# Patient Record
Sex: FEMALE | Race: WHITE | NOT HISPANIC OR LATINO
[De-identification: names, ages, dates, MRNs, and addresses within clinical notes are randomized per-mention and may not be internally consistent; named-entity substitution may affect disease eponyms.]

---

## 2018-07-27 ENCOUNTER — TRANSCRIPTION ENCOUNTER (OUTPATIENT)
Age: 43
End: 2018-07-27

## 2018-07-31 ENCOUNTER — APPOINTMENT (OUTPATIENT)
Dept: INTERNAL MEDICINE | Facility: CLINIC | Age: 43
End: 2018-07-31
Payer: MEDICARE

## 2018-07-31 VITALS
BODY MASS INDEX: 30.39 KG/M2 | HEART RATE: 128 BPM | HEIGHT: 64 IN | TEMPERATURE: 97.3 F | OXYGEN SATURATION: 98 % | WEIGHT: 178 LBS

## 2018-07-31 VITALS — SYSTOLIC BLOOD PRESSURE: 170 MMHG | DIASTOLIC BLOOD PRESSURE: 90 MMHG

## 2018-07-31 VITALS — DIASTOLIC BLOOD PRESSURE: 90 MMHG | SYSTOLIC BLOOD PRESSURE: 170 MMHG

## 2018-07-31 DIAGNOSIS — Z80.9 FAMILY HISTORY OF MALIGNANT NEOPLASM, UNSPECIFIED: ICD-10-CM

## 2018-07-31 PROCEDURE — 99204 OFFICE O/P NEW MOD 45 MIN: CPT

## 2018-07-31 PROCEDURE — 99214 OFFICE O/P EST MOD 30 MIN: CPT

## 2018-08-01 ENCOUNTER — OUTPATIENT (OUTPATIENT)
Dept: OUTPATIENT SERVICES | Facility: HOSPITAL | Age: 43
LOS: 1 days | End: 2018-08-01
Payer: MEDICARE

## 2018-08-01 PROCEDURE — G9001: CPT

## 2018-08-02 NOTE — PHYSICAL EXAM

## 2018-08-02 NOTE — ASSESSMENT
[FreeTextEntry1] : she agrees to see a therapist\par may need to see ortho but left knee seems better today and prob no need for a surgical procedreagrees to continue to cut down on pain meds\par will get her to sign a paper next month. reviewed I stop and her meds. since she has been been my patient in the past I trust her at this point and will see her in one mo

## 2018-08-02 NOTE — HEALTH RISK ASSESSMENT
[Any fall with injury in past year] : Patient reported fall with injury in the past year [1] : 2) Feeling down, depressed, or hopeless for several days (1) [] : No [de-identified] : Pt reports falling and banging up her knee. [FreeTextEntry1] : Pt reports her anxiety has been really high. [[id number]] : 2

## 2018-08-02 NOTE — PLAN
[FreeTextEntry1] : patient agrees to see a mental health provider, will take her meds judiciously and stay carefully with her meds\par must take bp meds

## 2018-08-02 NOTE — REVIEW OF SYSTEMS
[Negative] : Heme/Lymph [FreeTextEntry2] : stressed/ notin own house [FreeTextEntry5] : has high blood pressure [FreeTextEntry6] : stopped smoking.  [FreeTextEntry7] : no  [FreeTextEntry8] : no [FreeTextEntry9] : back pain, stable, shoulder hurts and it is tight, knee pain: went to urgent care, swollen, possible sprain. recurrent sprain of her right risk [de-identified] : no [de-identified] : stressed.  [FreeTextEntry1] : reg periods, had a pap sometime in the past, had one mammogtram but needs one

## 2018-08-02 NOTE — HISTORY OF PRESENT ILLNESS
[FreeTextEntry8] : here to update chart and as a new patient/ she has chronic pain in her back and left knee. also very stressed with unstable living arrangements.

## 2018-08-28 ENCOUNTER — CLINICAL ADVICE (OUTPATIENT)
Age: 43
End: 2018-08-28

## 2018-08-28 DIAGNOSIS — Z71.89 OTHER SPECIFIED COUNSELING: ICD-10-CM

## 2018-08-29 ENCOUNTER — APPOINTMENT (OUTPATIENT)
Dept: INTERNAL MEDICINE | Facility: CLINIC | Age: 43
End: 2018-08-29
Payer: MEDICARE

## 2018-08-29 VITALS
TEMPERATURE: 98.2 F | OXYGEN SATURATION: 98 % | WEIGHT: 175 LBS | HEIGHT: 64 IN | BODY MASS INDEX: 29.88 KG/M2 | HEART RATE: 91 BPM

## 2018-08-29 VITALS — SYSTOLIC BLOOD PRESSURE: 160 MMHG | DIASTOLIC BLOOD PRESSURE: 90 MMHG

## 2018-08-29 PROCEDURE — 99214 OFFICE O/P EST MOD 30 MIN: CPT

## 2018-08-29 NOTE — PHYSICAL EXAM

## 2018-08-29 NOTE — HEALTH RISK ASSESSMENT
[No falls in past year] : Patient reported no falls in the past year [0] : 1) Little interest or pleasure doing things: Not at all (0) [1] : 2) Feeling down, depressed, or hopeless for several days (1) [] : No [[id number]] : 1

## 2018-08-29 NOTE — REVIEW OF SYSTEMS
[Orthopnea] : orthopnea [Negative] : Heme/Lymph [FreeTextEntry2] : no weight loss, see above [FreeTextEntry5] : bp is really hi [FreeTextEntry6] : still smokes [FreeTextEntry7] : see above [FreeTextEntry8] : reg periods

## 2018-08-29 NOTE — HISTORY OF PRESENT ILLNESS
[FreeTextEntry8] : such stress in life.diarrhea , watery, x 3 days, is tolerating less pain meds: ran out of pain meds several days ago. wrist and ankle hurts,\par no fever or chills, no vomiting, watery stool persisits, no real pain, son is not sick\par no blood in stood. no h/o of ulcers

## 2018-08-29 NOTE — PLAN
[FreeTextEntry1] : reassurance about diarrhea, so much back ground stress\par discussed stress manageement. also discussed letter for support animal

## 2018-09-25 ENCOUNTER — MEDICATION RENEWAL (OUTPATIENT)
Age: 43
End: 2018-09-25

## 2018-09-25 RX ORDER — OXYCODONE AND ACETAMINOPHEN 10; 325 MG/1; MG/1
10-325 TABLET ORAL
Qty: 60 | Refills: 0 | Status: DISCONTINUED | COMMUNITY
Start: 2018-07-31 | End: 2018-09-25

## 2018-09-25 RX ORDER — OXYCODONE AND ACETAMINOPHEN 10; 325 MG/1; MG/1
10-325 TABLET ORAL
Qty: 50 | Refills: 0 | Status: DISCONTINUED | COMMUNITY
End: 2018-09-25

## 2018-09-25 RX ORDER — ALPRAZOLAM 0.5 MG/1
0.5 TABLET ORAL
Qty: 60 | Refills: 0 | Status: DISCONTINUED | COMMUNITY
End: 2018-09-25

## 2018-09-25 RX ORDER — ALPRAZOLAM 0.5 MG/1
0.5 TABLET ORAL
Qty: 60 | Refills: 0 | Status: DISCONTINUED | COMMUNITY
Start: 2018-07-31 | End: 2018-09-25

## 2018-09-26 ENCOUNTER — RX RENEWAL (OUTPATIENT)
Age: 43
End: 2018-09-26

## 2018-09-26 ENCOUNTER — MEDICATION RENEWAL (OUTPATIENT)
Age: 43
End: 2018-09-26

## 2018-10-01 ENCOUNTER — APPOINTMENT (OUTPATIENT)
Dept: INTERNAL MEDICINE | Facility: CLINIC | Age: 43
End: 2018-10-01
Payer: MEDICARE

## 2018-10-01 ENCOUNTER — LABORATORY RESULT (OUTPATIENT)
Age: 43
End: 2018-10-01

## 2018-10-01 VITALS — DIASTOLIC BLOOD PRESSURE: 80 MMHG | SYSTOLIC BLOOD PRESSURE: 130 MMHG

## 2018-10-01 VITALS — HEIGHT: 64 IN | HEART RATE: 122 BPM | OXYGEN SATURATION: 98 % | TEMPERATURE: 98.2 F

## 2018-10-01 PROCEDURE — 99214 OFFICE O/P EST MOD 30 MIN: CPT | Mod: 25

## 2018-10-01 PROCEDURE — 36415 COLL VENOUS BLD VENIPUNCTURE: CPT

## 2018-10-01 NOTE — HISTORY OF PRESENT ILLNESS
[FreeTextEntry8] : says had a fever and was sweating yesterday. trying to cut down on smoking. exhausted and stressed\par was diaphoretic\par no cough, no abd pain, no further diarrhea, no skin rash, no other sx. has a headache and backache, but no worse than usual\par no new sexual exposure

## 2018-10-01 NOTE — REVIEW OF SYSTEMS
[Fever] : fever [Chills] : chills [Fatigue] : fatigue [Night Sweats] : night sweats [Negative] : Cardiovascular [FreeTextEntry2] : feels tired [FreeTextEntry5] : had meds [FreeTextEntry6] : trying to cut back on meds

## 2018-10-01 NOTE — PLAN
[FreeTextEntry1] : cbc/ u/a. consdier CXR and further w/u. no chemistries today since blood already drawn and only purple top drawn. recheck one mo. patient to watch sx.

## 2018-10-01 NOTE — PHYSICAL EXAM
[No Acute Distress] : no acute distress [Well Nourished] : well nourished [Well Developed] : well developed [Well-Appearing] : well-appearing [Normal Sclera/Conjunctiva] : normal sclera/conjunctiva [PERRL] : pupils equal round and reactive to light [EOMI] : extraocular movements intact [Normal Outer Ear/Nose] : the outer ears and nose were normal in appearance [Normal Oropharynx] : the oropharynx was normal [No JVD] : no jugular venous distention [Supple] : supple [No Lymphadenopathy] : no lymphadenopathy [Thyroid Normal, No Nodules] : the thyroid was normal and there were no nodules present [No Respiratory Distress] : no respiratory distress  [No Accessory Muscle Use] : no accessory muscle use [Normal Rate] : normal rate  [Regular Rhythm] : with a regular rhythm [Normal S1, S2] : normal S1 and S2 [No Murmur] : no murmur heard [No Carotid Bruits] : no carotid bruits [No Abdominal Bruit] : a ~M bruit was not heard ~T in the abdomen [No Varicosities] : no varicosities [Pedal Pulses Present] : the pedal pulses are present [No Edema] : there was no peripheral edema [No Extremity Clubbing/Cyanosis] : no extremity clubbing/cyanosis [No Palpable Aorta] : no palpable aorta [Soft] : abdomen soft [Non Tender] : non-tender [Non-distended] : non-distended [No Masses] : no abdominal mass palpated [No HSM] : no HSM [Normal Bowel Sounds] : normal bowel sounds [Declined Rectal Exam] : declined rectal exam [Normal Posterior Cervical Nodes] : no posterior cervical lymphadenopathy [Normal Anterior Cervical Nodes] : no anterior cervical lymphadenopathy [No CVA Tenderness] : no CVA  tenderness [No Spinal Tenderness] : no spinal tenderness [No Joint Swelling] : no joint swelling [Grossly Normal Strength/Tone] : grossly normal strength/tone [No Rash] : no rash [Normal Gait] : normal gait [Coordination Grossly Intact] : coordination grossly intact [No Focal Deficits] : no focal deficits [Deep Tendon Reflexes (DTR)] : deep tendon reflexes were 2+ and symmetric [Speech Grossly Normal] : speech grossly normal [Normal Affect] : the affect was normal [Normal Insight/Judgement] : insight and judgment were intact [de-identified] : looks tired and weak  [de-identified] : throat clear. no LN [de-identified] : decrease bilat BS, some scattered rhonchi

## 2018-10-01 NOTE — ASSESSMENT
[FreeTextEntry1] : not clear of dx. do cbc and u/a, consider STD's , consider HIV test abd further eval if sx continue.

## 2018-10-01 NOTE — HEALTH RISK ASSESSMENT
[No falls in past year] : Patient reported no falls in the past year [0] : 1) Little interest or pleasure doing things: Not at all (0) [1] : 2) Feeling down, depressed, or hopeless for several days (1) [] : No [[id number]] : 1

## 2018-10-03 LAB
APPEARANCE: ABNORMAL
BASOPHILS # BLD AUTO: 0.01 K/UL
BASOPHILS NFR BLD AUTO: 0.2 %
BILIRUBIN URINE: NEGATIVE
BLOOD URINE: NEGATIVE
COLOR: ABNORMAL
EOSINOPHIL # BLD AUTO: 0.05 K/UL
EOSINOPHIL NFR BLD AUTO: 0.8 %
GLUCOSE QUALITATIVE U: NEGATIVE MG/DL
HCT VFR BLD CALC: 38.4 %
HGB BLD-MCNC: 12.5 G/DL
IMM GRANULOCYTES NFR BLD AUTO: 0.2 %
KETONES URINE: ABNORMAL
LEUKOCYTE ESTERASE URINE: NEGATIVE
LYMPHOCYTES # BLD AUTO: 1.65 K/UL
LYMPHOCYTES NFR BLD AUTO: 26.2 %
MAN DIFF?: NORMAL
MCHC RBC-ENTMCNC: 32.5 PG
MCHC RBC-ENTMCNC: 32.6 GM/DL
MCV RBC AUTO: 99.7 FL
MONOCYTES # BLD AUTO: 0.46 K/UL
MONOCYTES NFR BLD AUTO: 7.3 %
NEUTROPHILS # BLD AUTO: 4.12 K/UL
NEUTROPHILS NFR BLD AUTO: 65.3 %
NITRITE URINE: NEGATIVE
PH URINE: 5
PLATELET # BLD AUTO: 249 K/UL
PROTEIN URINE: ABNORMAL MG/DL
RBC # BLD: 3.85 M/UL
RBC # FLD: 13.7 %
SPECIFIC GRAVITY URINE: 1.02
UROBILINOGEN URINE: 1 MG/DL
WBC # FLD AUTO: 6.3 K/UL

## 2018-10-29 ENCOUNTER — APPOINTMENT (OUTPATIENT)
Dept: INTERNAL MEDICINE | Facility: CLINIC | Age: 43
End: 2018-10-29
Payer: MEDICARE

## 2018-10-29 ENCOUNTER — MEDICATION RENEWAL (OUTPATIENT)
Age: 43
End: 2018-10-29

## 2018-10-29 VITALS — HEIGHT: 64 IN | OXYGEN SATURATION: 96 % | HEART RATE: 121 BPM | TEMPERATURE: 98.6 F

## 2018-10-29 VITALS — SYSTOLIC BLOOD PRESSURE: 130 MMHG | DIASTOLIC BLOOD PRESSURE: 84 MMHG

## 2018-10-29 PROCEDURE — 99214 OFFICE O/P EST MOD 30 MIN: CPT

## 2018-10-29 RX ORDER — FLUOXETINE HYDROCHLORIDE 40 MG/1
40 CAPSULE ORAL DAILY
Qty: 30 | Refills: 3 | Status: COMPLETED | COMMUNITY
End: 2018-10-29

## 2018-10-29 RX ORDER — NITROFURANTOIN (MONOHYDRATE/MACROCRYSTALS) 25; 75 MG/1; MG/1
100 CAPSULE ORAL
Qty: 10 | Refills: 0 | Status: DISCONTINUED | COMMUNITY
Start: 2018-10-03 | End: 2018-10-29

## 2018-10-29 NOTE — REVIEW OF SYSTEMS
[Negative] : Gastrointestinal [FreeTextEntry2] : stressed and worried [FreeTextEntry5] : meghan garcia meds [FreeTextEntry6] : still smokes [FreeTextEntry8] : recetn uti [FreeTextEntry9] : same back and wrist pain

## 2018-10-29 NOTE — PHYSICAL EXAM
[Well Nourished] : well nourished [No Respiratory Distress] : no respiratory distress  [Clear to Auscultation] : lungs were clear to auscultation bilaterally [Normal Rate] : normal rate  [Regular Rhythm] : with a regular rhythm

## 2018-10-29 NOTE — HEALTH RISK ASSESSMENT
[No falls in past year] : Patient reported no falls in the past year [0] : 1) Little interest or pleasure doing things: Not at all (0) [1] : 2) Feeling down, depressed, or hopeless for several days (1) [] : No [[id number]] : 1

## 2018-10-30 LAB
APPEARANCE: CLEAR
BILIRUBIN URINE: NEGATIVE
BLOOD URINE: NEGATIVE
COLOR: YELLOW
GLUCOSE QUALITATIVE U: NEGATIVE MG/DL
KETONES URINE: NEGATIVE
LEUKOCYTE ESTERASE URINE: NEGATIVE
NITRITE URINE: NEGATIVE
PH URINE: 5
PROTEIN URINE: NEGATIVE MG/DL
SPECIFIC GRAVITY URINE: 1.01
UROBILINOGEN URINE: NEGATIVE MG/DL

## 2018-10-31 LAB — BACTERIA UR CULT: NORMAL

## 2018-11-26 ENCOUNTER — APPOINTMENT (OUTPATIENT)
Dept: INTERNAL MEDICINE | Facility: CLINIC | Age: 43
End: 2018-11-26
Payer: MEDICARE

## 2018-11-26 VITALS — HEART RATE: 89 BPM | OXYGEN SATURATION: 97 % | TEMPERATURE: 97.2 F

## 2018-11-26 VITALS — SYSTOLIC BLOOD PRESSURE: 150 MMHG | DIASTOLIC BLOOD PRESSURE: 90 MMHG

## 2018-11-26 PROCEDURE — 99213 OFFICE O/P EST LOW 20 MIN: CPT

## 2018-11-26 NOTE — HEALTH RISK ASSESSMENT
[No falls in past year] : Patient reported no falls in the past year [0] : 1) Little interest or pleasure doing things: Not at all (0) [1] : 2) Feeling down, depressed, or hopeless for several days (1) [] : No [[id number]] : 1

## 2018-11-26 NOTE — PHYSICAL EXAM

## 2018-11-27 ENCOUNTER — MEDICATION RENEWAL (OUTPATIENT)
Age: 43
End: 2018-11-27

## 2018-11-28 ENCOUNTER — MEDICATION RENEWAL (OUTPATIENT)
Age: 43
End: 2018-11-28

## 2018-12-20 ENCOUNTER — MEDICATION RENEWAL (OUTPATIENT)
Age: 43
End: 2018-12-20

## 2019-01-20 ENCOUNTER — LABORATORY RESULT (OUTPATIENT)
Age: 44
End: 2019-01-20

## 2019-01-21 ENCOUNTER — APPOINTMENT (OUTPATIENT)
Dept: INTERNAL MEDICINE | Facility: CLINIC | Age: 44
End: 2019-01-21
Payer: MEDICARE

## 2019-01-21 VITALS — SYSTOLIC BLOOD PRESSURE: 150 MMHG | DIASTOLIC BLOOD PRESSURE: 100 MMHG

## 2019-01-21 PROCEDURE — 99213 OFFICE O/P EST LOW 20 MIN: CPT

## 2019-01-21 NOTE — REVIEW OF SYSTEMS
[Negative] : Heme/Lymph [FreeTextEntry5] : has ht [FreeTextEntry8] : feels as if has uti again [FreeTextEntry9] : see above

## 2019-01-21 NOTE — PHYSICAL EXAM
[No Respiratory Distress] : no respiratory distress  [Clear to Auscultation] : lungs were clear to auscultation bilaterally [Normal Rate] : normal rate  [Regular Rhythm] : with a regular rhythm [de-identified] : upset and stressed [de-identified] : right knee warm and min effusion. decreased rom

## 2019-01-22 LAB
APPEARANCE: ABNORMAL
BILIRUBIN URINE: NEGATIVE
BLOOD URINE: NEGATIVE
COLOR: ABNORMAL
GLUCOSE QUALITATIVE U: NEGATIVE MG/DL
KETONES URINE: ABNORMAL
LEUKOCYTE ESTERASE URINE: NEGATIVE
NITRITE URINE: NEGATIVE
PH URINE: 5
PROTEIN URINE: NEGATIVE MG/DL
SPECIFIC GRAVITY URINE: 1.03
UROBILINOGEN URINE: NEGATIVE MG/DL

## 2019-02-06 ENCOUNTER — TRANSCRIPTION ENCOUNTER (OUTPATIENT)
Age: 44
End: 2019-02-06

## 2019-02-06 ENCOUNTER — APPOINTMENT (OUTPATIENT)
Dept: INTERNAL MEDICINE | Facility: CLINIC | Age: 44
End: 2019-02-06
Payer: MEDICARE

## 2019-02-06 VITALS
SYSTOLIC BLOOD PRESSURE: 140 MMHG | RESPIRATION RATE: 16 BRPM | TEMPERATURE: 97.3 F | HEIGHT: 64 IN | BODY MASS INDEX: 32.1 KG/M2 | DIASTOLIC BLOOD PRESSURE: 90 MMHG | WEIGHT: 188 LBS | OXYGEN SATURATION: 96 % | HEART RATE: 86 BPM

## 2019-02-06 DIAGNOSIS — J45.909 UNSPECIFIED ASTHMA, UNCOMPLICATED: ICD-10-CM

## 2019-02-06 DIAGNOSIS — Z78.9 OTHER SPECIFIED HEALTH STATUS: ICD-10-CM

## 2019-02-06 DIAGNOSIS — F17.210 NICOTINE DEPENDENCE, CIGARETTES, UNCOMPLICATED: ICD-10-CM

## 2019-02-06 PROCEDURE — 99214 OFFICE O/P EST MOD 30 MIN: CPT

## 2019-02-06 NOTE — ASSESSMENT
[Patient Optimized for Surgery] : Patient optimized for surgery [FreeTextEntry4] : 43 yr old presents today  before above scheduled  Surgery  on 2/6/19, There are no contraindications for Surgery at this time. Pt encouraged to Stop Smoking totally! Instr to stop OTC remedies which can elevate BP also, must take BP meds as directed.

## 2019-02-06 NOTE — REVIEW OF SYSTEMS
[Negative] : Heme/Lymph [Cough] : cough [Nasal Discharge] : nasal discharge [FreeTextEntry6] : mainly in morning  [FreeTextEntry9] : +Right Knee pain

## 2019-02-06 NOTE — RESULTS/DATA
[] : results reviewed [de-identified] : WNL ( see attached)  [de-identified] : WNL ( see attached)  [de-identified] : Normal ( see attached) [de-identified] : Sinus Rhythm BPM 98

## 2019-02-06 NOTE — PHYSICAL EXAM
[No Acute Distress] : no acute distress [Well Nourished] : well nourished [Well Developed] : well developed [Normal Sclera/Conjunctiva] : normal sclera/conjunctiva [Normal Outer Ear/Nose] : the outer ears and nose were normal in appearance [Normal Oropharynx] : the oropharynx was normal [Normal TMs] : both tympanic membranes were normal [No JVD] : no jugular venous distention [No Respiratory Distress] : no respiratory distress  [Clear to Auscultation] : lungs were clear to auscultation bilaterally [Normal Rate] : normal rate  [No Edema] : there was no peripheral edema [Soft] : abdomen soft [Normal Anterior Cervical Nodes] : no anterior cervical lymphadenopathy [No CVA Tenderness] : no CVA  tenderness [No Joint Swelling] : no joint swelling [No Rash] : no rash [Normal Gait] : normal gait [Coordination Grossly Intact] : coordination grossly intact [No Focal Deficits] : no focal deficits [Deep Tendon Reflexes (DTR)] : deep tendon reflexes were 2+ and symmetric [Speech Grossly Normal] : speech grossly normal [Memory Grossly Normal] : memory grossly normal [Normal Affect] : the affect was normal [Alert and Oriented x3] : oriented to person, place, and time [Normal Mood] : the mood was normal [Normal Insight/Judgement] : insight and judgment were intact [de-identified] : Obese in stature  [de-identified] : Slight redness noted , Septum opening ( likely due to drugs in past that are inhaled)  [de-identified] : + Right Knee pain on certain movements  [de-identified] : several tatoos and piercings  [de-identified] : +anxious

## 2019-02-18 ENCOUNTER — MEDICATION RENEWAL (OUTPATIENT)
Age: 44
End: 2019-02-18

## 2019-02-18 RX ORDER — ALPRAZOLAM 0.5 MG/1
0.5 TABLET ORAL
Qty: 60 | Refills: 0 | Status: DISCONTINUED | COMMUNITY
Start: 2018-10-01 | End: 2019-02-18

## 2019-02-18 RX ORDER — OXYCODONE AND ACETAMINOPHEN 10; 325 MG/1; MG/1
10-325 TABLET ORAL 3 TIMES DAILY
Qty: 80 | Refills: 0 | Status: DISCONTINUED | COMMUNITY
Start: 2018-11-28 | End: 2019-02-18

## 2019-02-18 RX ORDER — OXYCODONE AND ACETAMINOPHEN 10; 325 MG/1; MG/1
10-325 TABLET ORAL
Qty: 90 | Refills: 0 | Status: DISCONTINUED | COMMUNITY
Start: 2018-08-29 | End: 2019-02-18

## 2019-02-20 ENCOUNTER — APPOINTMENT (OUTPATIENT)
Dept: INTERNAL MEDICINE | Facility: CLINIC | Age: 44
End: 2019-02-20

## 2019-03-14 ENCOUNTER — APPOINTMENT (OUTPATIENT)
Dept: INTERNAL MEDICINE | Facility: CLINIC | Age: 44
End: 2019-03-14
Payer: MEDICARE

## 2019-03-14 VITALS
TEMPERATURE: 97.6 F | DIASTOLIC BLOOD PRESSURE: 90 MMHG | BODY MASS INDEX: 31.24 KG/M2 | SYSTOLIC BLOOD PRESSURE: 140 MMHG | OXYGEN SATURATION: 98 % | HEART RATE: 130 BPM | HEIGHT: 64 IN | WEIGHT: 183 LBS

## 2019-03-14 PROCEDURE — 99214 OFFICE O/P EST MOD 30 MIN: CPT

## 2019-03-14 RX ORDER — CIPROFLOXACIN AND DEXAMETHASONE 3; 1 MG/ML; MG/ML
0.3-0.1 SUSPENSION/ DROPS AURICULAR (OTIC) TWICE DAILY
Qty: 1 | Refills: 0 | Status: DISCONTINUED | COMMUNITY
Start: 2019-01-21 | End: 2019-03-14

## 2019-03-14 RX ORDER — MUPIROCIN 20 MG/G
2 OINTMENT TOPICAL
Qty: 1 | Refills: 2 | Status: ACTIVE | COMMUNITY
Start: 2019-03-14 | End: 1900-01-01

## 2019-03-14 RX ORDER — NICOTINE 21 MG/24HR
14 PATCH, TRANSDERMAL 24 HOURS TRANSDERMAL DAILY
Qty: 30 | Refills: 3 | Status: DISCONTINUED | COMMUNITY
Start: 2019-02-06 | End: 2019-03-14

## 2019-03-14 RX ORDER — DIPHENOXYLATE HYDROCHLORIDE AND ATROPINE SULFATE 2.5; .025 MG/1; MG/1
2.5-0.025 TABLET ORAL 4 TIMES DAILY
Qty: 160 | Refills: 0 | Status: DISCONTINUED | COMMUNITY
Start: 2018-08-29 | End: 2019-03-14

## 2019-03-14 NOTE — HISTORY OF PRESENT ILLNESS
[FreeTextEntry8] : The pt had a pimple on her Lt cheek that she picked at and now its red and mildly painful\par \par In addition she has C/O pain soles of her feet \par \par States she has recently lost her home and has # of stress in her life

## 2019-03-14 NOTE — HEALTH RISK ASSESSMENT
[No falls in past year] : Patient reported no falls in the past year [1] : 2) Feeling down, depressed, or hopeless for several days (1) [] : No [FreeTextEntry1] : PT reports feeling depressed several days. [[id number]] : 2

## 2019-03-14 NOTE — ASSESSMENT
[FreeTextEntry1] : A 43 yrs old pt presents for C/O \par 1) Excoriation on her Lt cheek; Re to her picking a pimple yesterday\par She is now covering it with bandied that is causing some further irritation ?contact dermatitis\par No induration at the site\par Will Rx with topical antibiotics/avoid band aid\par \par 2) Anxiety/Depression; Requesting refills on her meds\par \par 3) Chronic back pain; Requesting refill on her meds

## 2019-03-14 NOTE — PHYSICAL EXAM
[Well Nourished] : well nourished [PERRL] : pupils equal round and reactive to light [de-identified] : A superficial excoriation in area of Lt cheek/There is some irritation of skin in contact with bandaid

## 2019-03-18 ENCOUNTER — APPOINTMENT (OUTPATIENT)
Dept: INTERNAL MEDICINE | Facility: CLINIC | Age: 44
End: 2019-03-18

## 2019-03-27 ENCOUNTER — APPOINTMENT (OUTPATIENT)
Dept: INTERNAL MEDICINE | Facility: CLINIC | Age: 44
End: 2019-03-27

## 2019-04-15 ENCOUNTER — APPOINTMENT (OUTPATIENT)
Dept: INTERNAL MEDICINE | Facility: CLINIC | Age: 44
End: 2019-04-15

## 2019-04-17 ENCOUNTER — APPOINTMENT (OUTPATIENT)
Dept: INTERNAL MEDICINE | Facility: CLINIC | Age: 44
End: 2019-04-17
Payer: MEDICARE

## 2019-04-17 VITALS — DIASTOLIC BLOOD PRESSURE: 80 MMHG | SYSTOLIC BLOOD PRESSURE: 136 MMHG

## 2019-04-17 DIAGNOSIS — L70.9 ACNE, UNSPECIFIED: ICD-10-CM

## 2019-04-17 PROCEDURE — 99213 OFFICE O/P EST LOW 20 MIN: CPT

## 2019-04-17 NOTE — PHYSICAL EXAM
[No Acute Distress] : no acute distress [No Respiratory Distress] : no respiratory distress  [Clear to Auscultation] : lungs were clear to auscultation bilaterally [Normal Rate] : normal rate  [Regular Rhythm] : with a regular rhythm

## 2019-04-17 NOTE — HISTORY OF PRESENT ILLNESS
[FreeTextEntry8] : had knee surg\par has pain into her heels. can barelyl walk, her face is bad and her psychosocial problems are min better

## 2019-05-14 ENCOUNTER — RX CHANGE (OUTPATIENT)
Age: 44
End: 2019-05-14

## 2019-06-12 ENCOUNTER — APPOINTMENT (OUTPATIENT)
Dept: INTERNAL MEDICINE | Facility: CLINIC | Age: 44
End: 2019-06-12
Payer: MEDICARE

## 2019-06-12 VITALS
BODY MASS INDEX: 31.24 KG/M2 | HEART RATE: 106 BPM | HEIGHT: 64 IN | WEIGHT: 183 LBS | TEMPERATURE: 97.9 F | OXYGEN SATURATION: 98 %

## 2019-06-12 VITALS — DIASTOLIC BLOOD PRESSURE: 80 MMHG | SYSTOLIC BLOOD PRESSURE: 130 MMHG

## 2019-06-12 DIAGNOSIS — F43.9 REACTION TO SEVERE STRESS, UNSPECIFIED: ICD-10-CM

## 2019-06-12 DIAGNOSIS — G89.29 CERVICALGIA: ICD-10-CM

## 2019-06-12 DIAGNOSIS — Z87.898 PERSONAL HISTORY OF OTHER SPECIFIED CONDITIONS: ICD-10-CM

## 2019-06-12 DIAGNOSIS — M54.2 CERVICALGIA: ICD-10-CM

## 2019-06-12 DIAGNOSIS — S00.81XA ABRASION OF OTHER PART OF HEAD, INITIAL ENCOUNTER: ICD-10-CM

## 2019-06-12 DIAGNOSIS — Z87.440 PERSONAL HISTORY OF URINARY (TRACT) INFECTIONS: ICD-10-CM

## 2019-06-12 DIAGNOSIS — G43.909 MIGRAINE, UNSPECIFIED, NOT INTRACTABLE, W/OUT STATUS MIGRAINOSUS: ICD-10-CM

## 2019-06-12 DIAGNOSIS — M25.519 PAIN IN UNSPECIFIED SHOULDER: ICD-10-CM

## 2019-06-12 DIAGNOSIS — Z86.69 PERSONAL HISTORY OF OTHER DISEASES OF THE NERVOUS SYSTEM AND SENSE ORGANS: ICD-10-CM

## 2019-06-12 DIAGNOSIS — Z01.818 ENCOUNTER FOR OTHER PREPROCEDURAL EXAMINATION: ICD-10-CM

## 2019-06-12 PROCEDURE — 99213 OFFICE O/P EST LOW 20 MIN: CPT

## 2019-06-12 NOTE — HEALTH RISK ASSESSMENT
[No falls in past year] : Patient reported no falls in the past year [0] : 2) Feeling down, depressed, or hopeless: Not at all (0) [] : No [de-identified] : Urgent Care [de-identified] : Walking [[id number]] : 0 [de-identified] : No

## 2019-06-12 NOTE — REVIEW OF SYSTEMS
[Negative] : Psychiatric [FreeTextEntry6] : still smokes [FreeTextEntry7] : awakens each am with some sort of pelvic pain [FreeTextEntry9] : joint pain, injured her left ankle, now better and left heel hurts. low back pain,

## 2019-06-12 NOTE — PHYSICAL EXAM
[No JVD] : no jugular venous distention [Well Nourished] : well nourished [No Acute Distress] : no acute distress [No Respiratory Distress] : no respiratory distress  [Clear to Auscultation] : lungs were clear to auscultation bilaterally [Normal Rate] : normal rate  [Regular Rhythm] : with a regular rhythm [de-identified] : soft hips from , min pain in both ischeal areas

## 2019-07-12 ENCOUNTER — MEDICATION RENEWAL (OUTPATIENT)
Age: 44
End: 2019-07-12

## 2019-08-14 ENCOUNTER — APPOINTMENT (OUTPATIENT)
Dept: INTERNAL MEDICINE | Facility: CLINIC | Age: 44
End: 2019-08-14

## 2019-09-11 ENCOUNTER — LABORATORY RESULT (OUTPATIENT)
Age: 44
End: 2019-09-11

## 2019-09-12 ENCOUNTER — APPOINTMENT (OUTPATIENT)
Dept: INTERNAL MEDICINE | Facility: CLINIC | Age: 44
End: 2019-09-12
Payer: MEDICARE

## 2019-09-12 VITALS
BODY MASS INDEX: 31.41 KG/M2 | SYSTOLIC BLOOD PRESSURE: 125 MMHG | WEIGHT: 184 LBS | HEIGHT: 64 IN | OXYGEN SATURATION: 97 % | HEART RATE: 115 BPM | DIASTOLIC BLOOD PRESSURE: 88 MMHG | TEMPERATURE: 98 F

## 2019-09-12 DIAGNOSIS — Z12.39 ENCOUNTER FOR OTHER SCREENING FOR MALIGNANT NEOPLASM OF BREAST: ICD-10-CM

## 2019-09-12 DIAGNOSIS — Z00.00 ENCOUNTER FOR GENERAL ADULT MEDICAL EXAMINATION W/OUT ABNORMAL FINDINGS: ICD-10-CM

## 2019-09-12 PROCEDURE — 99213 OFFICE O/P EST LOW 20 MIN: CPT | Mod: 25

## 2019-09-12 PROCEDURE — 36415 COLL VENOUS BLD VENIPUNCTURE: CPT

## 2019-09-12 NOTE — PHYSICAL EXAM
[No Acute Distress] : no acute distress [Well Nourished] : well nourished [Normal] : no carotid or abdominal bruits heard, no varicosities, pedal pulses are present, no peripheral edema, no extremity clubbing or cyanosis and no palpable aorta

## 2019-09-12 NOTE — HEALTH RISK ASSESSMENT
[No] : No [No falls in past year] : Patient reported no falls in the past year [0] : 1) Little interest or pleasure doing things: Not at all (0) [1] : 2) Feeling down, depressed, or hopeless for several days (1) [] : No [Audit-CScore] : 0 [[id number]] : 1

## 2019-09-14 ENCOUNTER — TRANSCRIPTION ENCOUNTER (OUTPATIENT)
Age: 44
End: 2019-09-14

## 2019-09-14 LAB
ALBUMIN SERPL ELPH-MCNC: 4.2 G/DL
ALP BLD-CCNC: 69 U/L
ALT SERPL-CCNC: 12 U/L
ANION GAP SERPL CALC-SCNC: 11 MMOL/L
APPEARANCE: ABNORMAL
AST SERPL-CCNC: 17 U/L
BASOPHILS # BLD AUTO: 0.02 K/UL
BASOPHILS NFR BLD AUTO: 0.3 %
BILIRUB SERPL-MCNC: 0.2 MG/DL
BILIRUBIN URINE: NEGATIVE
BLOOD URINE: ABNORMAL
BUN SERPL-MCNC: 13 MG/DL
CALCIUM SERPL-MCNC: 9.2 MG/DL
CHLORIDE SERPL-SCNC: 103 MMOL/L
CHOLEST SERPL-MCNC: 194 MG/DL
CHOLEST/HDLC SERPL: 3.2 RATIO
CO2 SERPL-SCNC: 22 MMOL/L
COLOR: YELLOW
CREAT SERPL-MCNC: 0.49 MG/DL
EOSINOPHIL # BLD AUTO: 0.09 K/UL
EOSINOPHIL NFR BLD AUTO: 1.3 %
GLUCOSE QUALITATIVE U: NEGATIVE
GLUCOSE SERPL-MCNC: 100 MG/DL
HCT VFR BLD CALC: 39.5 %
HDLC SERPL-MCNC: 60 MG/DL
HGB BLD-MCNC: 12 G/DL
IMM GRANULOCYTES NFR BLD AUTO: 0.4 %
KETONES URINE: NEGATIVE
LDLC SERPL CALC-MCNC: 81 MG/DL
LEUKOCYTE ESTERASE URINE: NEGATIVE
LYMPHOCYTES # BLD AUTO: 2.47 K/UL
LYMPHOCYTES NFR BLD AUTO: 36.6 %
MAN DIFF?: NORMAL
MCHC RBC-ENTMCNC: 30.4 GM/DL
MCHC RBC-ENTMCNC: 31.4 PG
MCV RBC AUTO: 103.4 FL
MONOCYTES # BLD AUTO: 0.4 K/UL
MONOCYTES NFR BLD AUTO: 5.9 %
NEUTROPHILS # BLD AUTO: 3.74 K/UL
NEUTROPHILS NFR BLD AUTO: 55.5 %
NITRITE URINE: NEGATIVE
PH URINE: 6
PLATELET # BLD AUTO: 323 K/UL
POTASSIUM SERPL-SCNC: 4.3 MMOL/L
PROT SERPL-MCNC: 7.5 G/DL
PROTEIN URINE: NORMAL
RBC # BLD: 3.82 M/UL
RBC # FLD: 14.2 %
SODIUM SERPL-SCNC: 136 MMOL/L
SPECIFIC GRAVITY URINE: 1.02
TRIGL SERPL-MCNC: 266 MG/DL
TSH SERPL-ACNC: 2.52 UIU/ML
UROBILINOGEN URINE: NORMAL
WBC # FLD AUTO: 6.75 K/UL

## 2019-10-22 ENCOUNTER — MEDICATION RENEWAL (OUTPATIENT)
Age: 44
End: 2019-10-22

## 2019-11-08 ENCOUNTER — MEDICATION RENEWAL (OUTPATIENT)
Age: 44
End: 2019-11-08

## 2019-11-11 ENCOUNTER — MEDICATION RENEWAL (OUTPATIENT)
Age: 44
End: 2019-11-11

## 2019-12-02 ENCOUNTER — MEDICATION RENEWAL (OUTPATIENT)
Age: 44
End: 2019-12-02

## 2019-12-12 ENCOUNTER — APPOINTMENT (OUTPATIENT)
Dept: INTERNAL MEDICINE | Facility: CLINIC | Age: 44
End: 2019-12-12
Payer: MEDICARE

## 2019-12-12 VITALS
HEIGHT: 64 IN | HEART RATE: 87 BPM | SYSTOLIC BLOOD PRESSURE: 116 MMHG | OXYGEN SATURATION: 98 % | BODY MASS INDEX: 31.58 KG/M2 | WEIGHT: 185 LBS | DIASTOLIC BLOOD PRESSURE: 74 MMHG | TEMPERATURE: 99.1 F

## 2019-12-12 DIAGNOSIS — N93.8 OTHER SPECIFIED ABNORMAL UTERINE AND VAGINAL BLEEDING: ICD-10-CM

## 2019-12-12 PROCEDURE — 99213 OFFICE O/P EST LOW 20 MIN: CPT

## 2019-12-12 NOTE — HEALTH RISK ASSESSMENT
[No] : In the past 12 months have you used drugs other than those required for medical reasons? No [No falls in past year] : Patient reported no falls in the past year [1] : 2) Feeling down, depressed, or hopeless for several days (1) [] : No [de-identified] : none [Audit-CScore] : 0 [de-identified] : none [de-identified] : normal [[id number]] : 2

## 2019-12-18 ENCOUNTER — RESULT REVIEW (OUTPATIENT)
Age: 44
End: 2019-12-18

## 2020-01-23 ENCOUNTER — APPOINTMENT (OUTPATIENT)
Dept: INTERNAL MEDICINE | Facility: CLINIC | Age: 45
End: 2020-01-23
Payer: MEDICARE

## 2020-01-23 VITALS
BODY MASS INDEX: 29.88 KG/M2 | HEIGHT: 64 IN | HEART RATE: 113 BPM | TEMPERATURE: 98.2 F | WEIGHT: 175 LBS | OXYGEN SATURATION: 100 %

## 2020-01-23 VITALS — DIASTOLIC BLOOD PRESSURE: 90 MMHG | SYSTOLIC BLOOD PRESSURE: 130 MMHG

## 2020-01-23 PROCEDURE — 99213 OFFICE O/P EST LOW 20 MIN: CPT

## 2020-01-23 NOTE — REVIEW OF SYSTEMS
[FreeTextEntry9] : knee surg 2/7/19: had meniscus tear: did therapy and did exercises , got better and pain and swelling at the end of the day. no fall. feels hot to the touch.

## 2020-01-23 NOTE — PHYSICAL EXAM
[Normal] : no jugular venous distention, supple, no lymphadenopathy and the thyroid was normal and there were no nodules present [de-identified] : right knee warm and swollen and tender to the touch with an effusion.

## 2020-01-23 NOTE — HEALTH RISK ASSESSMENT
[No] : In the past 12 months have you used drugs other than those required for medical reasons? No [No falls in past year] : Patient reported no falls in the past year [0] : 2) Feeling down, depressed, or hopeless: Not at all (0) [] : No [de-identified] : GYN [Audit-CScore] : 0 [de-identified] : none [de-identified] : normal [[id number]] : 0

## 2020-01-23 NOTE — HISTORY OF PRESENT ILLNESS
[FreeTextEntry1] : here for med refills [de-identified] : saw gyn and all is ok and going for a mammogram\par generally feeling min better\par back pain is the same

## 2020-02-19 ENCOUNTER — APPOINTMENT (OUTPATIENT)
Dept: INTERNAL MEDICINE | Facility: CLINIC | Age: 45
End: 2020-02-19
Payer: MEDICARE

## 2020-02-19 VITALS
HEART RATE: 114 BPM | HEIGHT: 64 IN | WEIGHT: 168 LBS | BODY MASS INDEX: 28.68 KG/M2 | TEMPERATURE: 97.5 F | OXYGEN SATURATION: 98 %

## 2020-02-19 VITALS — SYSTOLIC BLOOD PRESSURE: 138 MMHG | DIASTOLIC BLOOD PRESSURE: 84 MMHG

## 2020-02-19 PROCEDURE — 99213 OFFICE O/P EST LOW 20 MIN: CPT

## 2020-02-19 NOTE — PHYSICAL EXAM
[Normal] : normal rate, regular rhythm, normal S1 and S2 and no murmur heard [de-identified] : limping

## 2020-02-19 NOTE — ASSESSMENT
[FreeTextEntry1] : rel stable\par still smoking\par bp well controlled\par housing situation improved

## 2020-04-15 ENCOUNTER — APPOINTMENT (OUTPATIENT)
Dept: INTERNAL MEDICINE | Facility: CLINIC | Age: 45
End: 2020-04-15
Payer: MEDICARE

## 2020-04-15 PROCEDURE — 99441: CPT

## 2020-06-26 ENCOUNTER — LABORATORY RESULT (OUTPATIENT)
Age: 45
End: 2020-06-26

## 2020-06-26 ENCOUNTER — APPOINTMENT (OUTPATIENT)
Dept: INTERNAL MEDICINE | Facility: CLINIC | Age: 45
End: 2020-06-26
Payer: MEDICARE

## 2020-06-26 VITALS
HEART RATE: 115 BPM | BODY MASS INDEX: 28.17 KG/M2 | OXYGEN SATURATION: 98 % | WEIGHT: 165 LBS | TEMPERATURE: 97.9 F | HEIGHT: 64 IN

## 2020-06-26 VITALS — SYSTOLIC BLOOD PRESSURE: 130 MMHG | DIASTOLIC BLOOD PRESSURE: 80 MMHG

## 2020-06-26 PROCEDURE — 93000 ELECTROCARDIOGRAM COMPLETE: CPT

## 2020-06-26 PROCEDURE — 36415 COLL VENOUS BLD VENIPUNCTURE: CPT

## 2020-06-26 PROCEDURE — G0439: CPT

## 2020-06-28 NOTE — ASSESSMENT
[FreeTextEntry1] : blood pressure is well controlled\par has been on opiates for several years for variety of pains including R wrist,back, both knees,neck.\par need for gradual dose reduction of Endocet is clear and patient agrees and has signed controlled drug contract\par

## 2020-06-28 NOTE — HISTORY OF PRESENT ILLNESS
[FreeTextEntry1] : returns for routine follow-up [de-identified] : 46 yo female taking multiple meds as listed for chronic pain,generalized anxiety,depression.\par has been on endocet continually for several years for management of pain from multiple sites including knees,R wrist, neck and lower back\par also on xanax ,cymbalta and trazadone for anxiety and depression\par recently saw ortho who prescribed .Voltaren

## 2020-06-28 NOTE — PLAN
[FreeTextEntry1] : will slowly decrease dose endocet as noted\par urine tox screen\par routine labs done\par RTC one month

## 2020-06-28 NOTE — HEALTH RISK ASSESSMENT
[Fair] :  ~his/her~ mood as fair [No] : In the past 12 months have you used drugs other than those required for medical reasons? No [No falls in past year] : Patient reported no falls in the past year [0] : 2) Feeling down, depressed, or hopeless: Not at all (0) [HIV test declined] : HIV test declined [Hepatitis C test declined] : Hepatitis C test declined [Unemployed] : unemployed [With Family] : lives with family [Single] : single [Feels Safe at Home] : Feels safe at home [Fully functional (bathing, dressing, toileting, transferring, walking, feeding)] : Fully functional (bathing, dressing, toileting, transferring, walking, feeding) [Fully functional (using the telephone, shopping, preparing meals, housekeeping, doing laundry, using] : Fully functional and needs no help or supervision to perform IADLs (using the telephone, shopping, preparing meals, housekeeping, doing laundry, using transportation, managing medications and managing finances) [Smoke Detector] : smoke detector [Carbon Monoxide Detector] : carbon monoxide detector [Safety elements used in home] : safety elements used in home [Seat Belt] :  uses seat belt [] : No [de-identified] : orthopedics [de-identified] : normal  [[id number]] : 0 [Audit-CScore] : 0 [de-identified] : none [Change in mental status noted] : No change in mental status noted [Language] : denies difficulty with language [Behavior] : denies difficulty with behavior [Learning/Retaining New Information] : denies difficulty learning/retaining new information [Handling Complex Tasks] : denies difficulty handling complex tasks [Reasoning] : denies difficulty with reasoning [Reports changes in vision] : Reports no changes in vision [Spatial Ability and Orientation] : denies difficulty with spatial ability and orientation [Reports changes in hearing] : Reports no changes in hearing [Reports normal functional visual acuity (ie: able to read med bottle)] : Reports poor functional visual acuity.  [Guns at Home] : no guns at home [Reports changes in dental health] : Reports no changes in dental health [TB Exposure] : is not being exposed to tuberculosis [Travel to Developing Areas] : does not  travel to developing areas [Caregiver Concerns] : does not have caregiver concerns [Sunscreen] : does not use sunscreen

## 2020-06-28 NOTE — REVIEW OF SYSTEMS
[Joint Pain] : joint pain [Joint Stiffness] : joint stiffness [Insomnia] : insomnia [Depression] : depression [Anxiety] : anxiety [Fever] : no fever [Chest Pain] : no chest pain [Shortness Of Breath] : no shortness of breath [Abdominal Pain] : no abdominal pain [Dysuria] : no dysuria [Suicidal] : not suicidal

## 2020-06-28 NOTE — PHYSICAL EXAM
[No Acute Distress] : no acute distress [Normal Oropharynx] : the oropharynx was normal [No Lymphadenopathy] : no lymphadenopathy [Clear to Auscultation] : lungs were clear to auscultation bilaterally [Regular Rhythm] : with a regular rhythm [No Edema] : there was no peripheral edema [Normal Rate] : normal rate  [Normal Appearance] : normal in appearance [Soft] : abdomen soft [No Masses] : no palpable masses [Non Tender] : non-tender [No CVA Tenderness] : no CVA  tenderness [No Joint Swelling] : no joint swelling [No Rash] : no rash [No Spinal Tenderness] : no spinal tenderness [No Focal Deficits] : no focal deficits [Alert and Oriented x3] : oriented to person, place, and time [Normal Gait] : normal gait [de-identified] : full ROM  knees. no warmth or significant tenderness

## 2020-08-08 LAB
ALBUMIN SERPL ELPH-MCNC: 4.7 G/DL
ALP BLD-CCNC: 79 U/L
ALT SERPL-CCNC: 18 U/L
AMPHET UR-MCNC: NEGATIVE
ANION GAP SERPL CALC-SCNC: 16 MMOL/L
AST SERPL-CCNC: 24 U/L
BARBITURATES UR-MCNC: NEGATIVE
BASOPHILS # BLD AUTO: 0.02 K/UL
BASOPHILS NFR BLD AUTO: 0.3 %
BENZODIAZ UR-MCNC: NORMAL
BILIRUB SERPL-MCNC: 0.3 MG/DL
BUN SERPL-MCNC: 9 MG/DL
CALCIUM SERPL-MCNC: 9.2 MG/DL
CHLORIDE SERPL-SCNC: 100 MMOL/L
CHOLEST SERPL-MCNC: 164 MG/DL
CHOLEST/HDLC SERPL: 2.4 RATIO
CO2 SERPL-SCNC: 18 MMOL/L
COCAINE METAB.OTHER UR-MCNC: NEGATIVE
CREAT SERPL-MCNC: 0.49 MG/DL
CREATININE, URINE: 67.8 MG/DL
EOSINOPHIL # BLD AUTO: 0.08 K/UL
EOSINOPHIL NFR BLD AUTO: 1.2 %
GLUCOSE SERPL-MCNC: 123 MG/DL
HCT VFR BLD CALC: 41.8 %
HDLC SERPL-MCNC: 67 MG/DL
HGB BLD-MCNC: 12.6 G/DL
IMM GRANULOCYTES NFR BLD AUTO: 0.2 %
LDLC SERPL CALC-MCNC: 65 MG/DL
LYMPHOCYTES # BLD AUTO: 1.33 K/UL
LYMPHOCYTES NFR BLD AUTO: 20.7 %
MAN DIFF?: NORMAL
MCHC RBC-ENTMCNC: 30.1 GM/DL
MCHC RBC-ENTMCNC: 31.1 PG
MCV RBC AUTO: 103.2 FL
METHADONE UR-MCNC: NEGATIVE
METHAQUALONE UR-MCNC: NEGATIVE
MONOCYTES # BLD AUTO: 0.34 K/UL
MONOCYTES NFR BLD AUTO: 5.3 %
NEUTROPHILS # BLD AUTO: 4.65 K/UL
NEUTROPHILS NFR BLD AUTO: 72.3 %
OPIATES UR-MCNC: NEGATIVE
OXYCODONE UR-MCNC: NEGATIVE
PCP UR-MCNC: NEGATIVE
PLATELET # BLD AUTO: 279 K/UL
POTASSIUM SERPL-SCNC: 4.2 MMOL/L
PROPOXYPH UR QL: NEGATIVE
PROT SERPL-MCNC: 7.6 G/DL
RBC # BLD: 4.05 M/UL
RBC # FLD: 14.6 %
SODIUM SERPL-SCNC: 134 MMOL/L
T4 FREE SERPL-MCNC: 1.1 NG/DL
THC UR QL: NORMAL
TRIGL SERPL-MCNC: 157 MG/DL
TSH SERPL-ACNC: 1.86 UIU/ML
WBC # FLD AUTO: 6.43 K/UL

## 2020-08-12 ENCOUNTER — LABORATORY RESULT (OUTPATIENT)
Age: 45
End: 2020-08-12

## 2020-08-12 ENCOUNTER — APPOINTMENT (OUTPATIENT)
Dept: INTERNAL MEDICINE | Facility: CLINIC | Age: 45
End: 2020-08-12
Payer: MEDICARE

## 2020-08-12 VITALS
TEMPERATURE: 98.1 F | HEART RATE: 114 BPM | DIASTOLIC BLOOD PRESSURE: 85 MMHG | OXYGEN SATURATION: 97 % | WEIGHT: 174 LBS | SYSTOLIC BLOOD PRESSURE: 130 MMHG | HEIGHT: 64 IN | RESPIRATION RATE: 16 BRPM | BODY MASS INDEX: 29.71 KG/M2

## 2020-08-12 DIAGNOSIS — I10 ESSENTIAL (PRIMARY) HYPERTENSION: ICD-10-CM

## 2020-08-12 PROCEDURE — 99214 OFFICE O/P EST MOD 30 MIN: CPT

## 2020-08-12 RX ORDER — ALBUTEROL SULFATE 90 UG/1
108 (90 BASE) INHALANT RESPIRATORY (INHALATION)
Qty: 1 | Refills: 3 | Status: ACTIVE | COMMUNITY
Start: 1900-01-01 | End: 1900-01-01

## 2020-08-14 NOTE — PHYSICAL EXAM
[Well Nourished] : well nourished [No Acute Distress] : no acute distress [Well-Appearing] : well-appearing [Well Developed] : well developed [Normal Sclera/Conjunctiva] : normal sclera/conjunctiva [No Lymphadenopathy] : no lymphadenopathy [No Respiratory Distress] : no respiratory distress  [No JVD] : no jugular venous distention [No Accessory Muscle Use] : no accessory muscle use [Clear to Auscultation] : lungs were clear to auscultation bilaterally [Normal Rate] : normal rate  [Normal S1, S2] : normal S1 and S2 [Regular Rhythm] : with a regular rhythm [No Joint Swelling] : no joint swelling [No CVA Tenderness] : no CVA  tenderness [No Edema] : there was no peripheral edema [Normal Anterior Cervical Nodes] : no anterior cervical lymphadenopathy [Coordination Grossly Intact] : coordination grossly intact [No Rash] : no rash [No Focal Deficits] : no focal deficits [Memory Grossly Normal] : memory grossly normal [Speech Grossly Normal] : speech grossly normal [Normal Affect] : the affect was normal [Normal Mood] : the mood was normal [Alert and Oriented x3] : oriented to person, place, and time [Normal Insight/Judgement] : insight and judgment were intact [de-identified] : looks slightly sedated  [de-identified] : wearing mask as per guidelines  [de-identified] : Chronic LBP approx L5- S1  [de-identified] : tachycardia  [de-identified] : ++Left inner thigh eccymosis , round shaped !/3 inner thigh area, surrounding skin yellowish / resolving bruising  [de-identified] : walks with wide gait , limps from knee pain according to patient

## 2020-08-14 NOTE — COUNSELING
[Cessation strategies including cessation program discussed] : Cessation strategies including cessation program discussed [Risk of tobacco use and health benefits of smoking cessation discussed] : Risk of tobacco use and health benefits of smoking cessation discussed [Use of nicotine replacement therapies and other medications discussed] : Use of nicotine replacement therapies and other medications discussed [Encouraged to pick a quit date and identify support needed to quit] : Encouraged to pick a quit date and identify support needed to quit [Other: ____] : [unfilled] [Willing to Quit Smoking] : Not willing to quit smoking

## 2020-08-14 NOTE — REVIEW OF SYSTEMS
[Joint Pain] : joint pain [Joint Stiffness] : joint stiffness [Joint Swelling] : joint swelling [Muscle Pain] : muscle pain [Back Pain] : back pain [Insomnia] : insomnia [Anxiety] : anxiety [Depression] : depression [Negative] : Heme/Lymph

## 2020-08-14 NOTE — HEALTH RISK ASSESSMENT
[No] : In the past 12 months have you used drugs other than those required for medical reasons? No [No falls in past year] : Patient reported no falls in the past year [0] : 2) Feeling down, depressed, or hopeless: Not at all (0) [de-identified] : none [] : No [de-identified] : orthopedic  [Audit-CScore] : 0 [de-identified] : none [[id number]] : 0 [de-identified] : normal

## 2020-08-14 NOTE — ASSESSMENT
[FreeTextEntry1] : 45yr old presents today F/U \par \par \par Mult medical conditions,  Chronic pain, Ortho problems\par \par Clinically  Stable at this time.\par \par Urine for  Tox and Oxycodone confirmation done

## 2020-08-14 NOTE — HISTORY OF PRESENT ILLNESS
[FreeTextEntry1] : F/U pain meds  [de-identified] : 45 yr old presents today for above, feels same , chronic pain, chronic meds,PCP/ Dr. Portillo has left practice thus may be seeing Pt on regular basis.  Shows Provider her inner left thigh which is bruised and she is not sure how this happened, no known accident and/ or injury. Recently seen by Ortho, need to get report.

## 2020-08-21 LAB
OXYCODONE, URINE: 9438 NG/ML
OXYMORPHONE, URINE: 2816 NG/ML

## 2020-08-26 ENCOUNTER — APPOINTMENT (OUTPATIENT)
Dept: INTERNAL MEDICINE | Facility: CLINIC | Age: 45
End: 2020-08-26
Payer: MEDICARE

## 2020-08-26 VITALS
TEMPERATURE: 98.1 F | HEIGHT: 64 IN | WEIGHT: 174 LBS | HEART RATE: 121 BPM | OXYGEN SATURATION: 96 % | BODY MASS INDEX: 29.71 KG/M2

## 2020-08-26 VITALS — SYSTOLIC BLOOD PRESSURE: 140 MMHG | DIASTOLIC BLOOD PRESSURE: 90 MMHG

## 2020-08-26 DIAGNOSIS — M25.569 PAIN IN UNSPECIFIED KNEE: ICD-10-CM

## 2020-08-26 PROCEDURE — 36415 COLL VENOUS BLD VENIPUNCTURE: CPT

## 2020-08-26 PROCEDURE — 99214 OFFICE O/P EST MOD 30 MIN: CPT | Mod: 25

## 2020-08-26 PROCEDURE — 93000 ELECTROCARDIOGRAM COMPLETE: CPT

## 2020-08-27 LAB
ALBUMIN SERPL ELPH-MCNC: 4.4 G/DL
ALP BLD-CCNC: 66 U/L
ALT SERPL-CCNC: 19 U/L
ANION GAP SERPL CALC-SCNC: 11 MMOL/L
AST SERPL-CCNC: 24 U/L
BASOPHILS # BLD AUTO: 0.03 K/UL
BASOPHILS NFR BLD AUTO: 0.5 %
BILIRUB SERPL-MCNC: 0.3 MG/DL
BUN SERPL-MCNC: 13 MG/DL
CALCIUM SERPL-MCNC: 9.2 MG/DL
CHLORIDE SERPL-SCNC: 101 MMOL/L
CHOLEST SERPL-MCNC: 176 MG/DL
CHOLEST/HDLC SERPL: 2.7 RATIO
CO2 SERPL-SCNC: 23 MMOL/L
CREAT SERPL-MCNC: 0.52 MG/DL
EOSINOPHIL # BLD AUTO: 0.07 K/UL
EOSINOPHIL NFR BLD AUTO: 1.1 %
GLUCOSE SERPL-MCNC: 123 MG/DL
HCT VFR BLD CALC: 38.5 %
HDLC SERPL-MCNC: 66 MG/DL
HGB BLD-MCNC: 12.3 G/DL
IMM GRANULOCYTES NFR BLD AUTO: 0.2 %
INR PPP: 1 RATIO
LDLC SERPL CALC-MCNC: 88 MG/DL
LYMPHOCYTES # BLD AUTO: 2.19 K/UL
LYMPHOCYTES NFR BLD AUTO: 33.5 %
MAN DIFF?: NORMAL
MCHC RBC-ENTMCNC: 31.9 GM/DL
MCHC RBC-ENTMCNC: 32.4 PG
MCV RBC AUTO: 101.3 FL
MONOCYTES # BLD AUTO: 0.34 K/UL
MONOCYTES NFR BLD AUTO: 5.2 %
NEUTROPHILS # BLD AUTO: 3.9 K/UL
NEUTROPHILS NFR BLD AUTO: 59.5 %
PLATELET # BLD AUTO: 273 K/UL
POTASSIUM SERPL-SCNC: 4 MMOL/L
PROT SERPL-MCNC: 6.7 G/DL
PT BLD: 11.9 SEC
RBC # BLD: 3.8 M/UL
RBC # FLD: 13.3 %
SODIUM SERPL-SCNC: 136 MMOL/L
TRIGL SERPL-MCNC: 108 MG/DL
TSH SERPL-ACNC: 1.16 UIU/ML
WBC # FLD AUTO: 6.54 K/UL

## 2020-08-27 NOTE — ASSESSMENT
[Patient Optimized for Surgery] : Patient optimized for surgery [No Further Testing Recommended] : no further testing recommended [FreeTextEntry4] : ekg nsr \par pre-op labs acceptable

## 2020-08-27 NOTE — REVIEW OF SYSTEMS
[Joint Pain] : joint pain [Anxiety] : anxiety [Fever] : no fever [Earache] : no earache [Chest Pain] : no chest pain [Shortness Of Breath] : no shortness of breath [Abdominal Pain] : no abdominal pain [Dysuria] : no dysuria

## 2020-08-27 NOTE — PHYSICAL EXAM
[No Acute Distress] : no acute distress [No JVD] : no jugular venous distention [No Accessory Muscle Use] : no accessory muscle use [Clear to Auscultation] : lungs were clear to auscultation bilaterally [Regular Rhythm] : with a regular rhythm [Normal S1, S2] : normal S1 and S2 [Soft] : abdomen soft [Non Tender] : non-tender [Normal Supraclavicular Nodes] : no supraclavicular lymphadenopathy [No CVA Tenderness] : no CVA  tenderness [No Rash] : no rash [No Focal Deficits] : no focal deficits [Normal Affect] : the affect was normal [Alert and Oriented x3] : oriented to person, place, and time

## 2020-08-27 NOTE — HISTORY OF PRESENT ILLNESS
[No Pertinent Cardiac History] : no history of aortic stenosis, atrial fibrillation, coronary artery disease, recent myocardial infarction, or implantable device/pacemaker [Asthma] : asthma [No Adverse Anesthesia Reaction] : no adverse anesthesia reaction in self or family member [(Patient denies any chest pain, claudication, dyspnea on exertion, orthopnea, palpitations or syncope)] : Patient denies any chest pain, claudication, dyspnea on exertion, orthopnea, palpitations or syncope [Good (7-10 METs)] : Good (7-10 METs) [COPD] : no COPD [Sleep Apnea] : no sleep apnea [Smoker] : not a smoker [Chronic Anticoagulation] : no chronic anticoagulation [Chronic Kidney Disease] : no chronic kidney disease [Diabetes] : no diabetes [FreeTextEntry1] : Left knee arthroscopy and partial medial meniscectomy [FreeTextEntry2] : 9/1/2020 [FreeTextEntry3] : Dr. Dallas Paniagua [FreeTextEntry4] : L knee meniscus surgery scheduled for 9/1/20

## 2020-08-28 LAB
AMPHET UR-MCNC: NEGATIVE
BARBITURATES UR-MCNC: NEGATIVE
BENZODIAZ UR-MCNC: NORMAL
COCAINE METAB.OTHER UR-MCNC: NEGATIVE
CREATININE, URINE: 111.6 MG/DL
METHADONE UR-MCNC: NEGATIVE
METHAQUALONE UR-MCNC: NEGATIVE
OPIATES UR-MCNC: NEGATIVE
PCP UR-MCNC: NEGATIVE
PROPOXYPH UR QL: NEGATIVE
THC UR QL: NORMAL

## 2020-10-09 ENCOUNTER — APPOINTMENT (OUTPATIENT)
Dept: INTERNAL MEDICINE | Facility: CLINIC | Age: 45
End: 2020-10-09
Payer: MEDICARE

## 2020-10-09 VITALS
BODY MASS INDEX: 30.05 KG/M2 | HEART RATE: 99 BPM | SYSTOLIC BLOOD PRESSURE: 136 MMHG | OXYGEN SATURATION: 98 % | TEMPERATURE: 98.2 F | WEIGHT: 176 LBS | DIASTOLIC BLOOD PRESSURE: 80 MMHG | HEIGHT: 64 IN

## 2020-10-09 DIAGNOSIS — J06.9 ACUTE UPPER RESPIRATORY INFECTION, UNSPECIFIED: ICD-10-CM

## 2020-10-09 PROCEDURE — 99214 OFFICE O/P EST MOD 30 MIN: CPT

## 2020-10-10 NOTE — REVIEW OF SYSTEMS
[Fever] : no fever [Sore Throat] : sore throat [Chest Pain] : no chest pain [Shortness Of Breath] : no shortness of breath [Dysuria] : no dysuria

## 2020-10-10 NOTE — PHYSICAL EXAM
[Well Nourished] : well nourished [Normal Oropharynx] : the oropharynx was normal [No Lymphadenopathy] : no lymphadenopathy [Regular Rhythm] : with a regular rhythm [No Edema] : there was no peripheral edema [Soft] : abdomen soft [Non Tender] : non-tender [Non-distended] : non-distended [Normal Supraclavicular Nodes] : no supraclavicular lymphadenopathy [No Joint Swelling] : no joint swelling [No Focal Deficits] : no focal deficits [Normal Affect] : the affect was normal [Alert and Oriented x3] : oriented to person, place, and time

## 2020-10-10 NOTE — HISTORY OF PRESENT ILLNESS
[FreeTextEntry8] : went to Urgent Care 5 days ago for evaluation cough/congestion. Covid was reportedly negative\par given 5 day Z-pack and not much improved. no fever no dyspnea

## 2020-10-10 NOTE — ASSESSMENT
[FreeTextEntry1] : signs and symptoms c/w viral URI prior covid was negative 5 days ago\par will follow on OTC claritin

## 2020-10-10 NOTE — HEALTH RISK ASSESSMENT
[Yes] : Yes [2 - 4 times a month (2 pts)] : 2-4 times a month (2 points) [1 or 2 (0 pts)] : 1 or 2 (0 points) [Never (0 pts)] : Never (0 points) [No] : In the past 12 months have you used drugs other than those required for medical reasons? No [No falls in past year] : Patient reported no falls in the past year [0] : 2) Feeling down, depressed, or hopeless: Not at all (0) [] : No [de-identified] : No [de-identified] : Physical therapy  [Audit-CScore] : 2 [de-identified] : Physical Therapy  [de-identified] : Normal Diet  [[id number]] : 0

## 2020-10-29 ENCOUNTER — RX RENEWAL (OUTPATIENT)
Age: 45
End: 2020-10-29

## 2020-11-11 ENCOUNTER — APPOINTMENT (OUTPATIENT)
Dept: INTERNAL MEDICINE | Facility: CLINIC | Age: 45
End: 2020-11-11

## 2020-12-14 ENCOUNTER — APPOINTMENT (OUTPATIENT)
Dept: INTERNAL MEDICINE | Facility: CLINIC | Age: 45
End: 2020-12-14
Payer: MEDICARE

## 2020-12-23 PROBLEM — J06.9 ACUTE URI: Status: RESOLVED | Noted: 2020-10-10 | Resolved: 2020-12-23

## 2020-12-31 ENCOUNTER — RX RENEWAL (OUTPATIENT)
Age: 45
End: 2020-12-31

## 2020-12-31 RX ORDER — AMLODIPINE BESYLATE AND BENAZEPRIL HYDROCHLORIDE 10; 20 MG/1; MG/1
10-20 CAPSULE ORAL
Qty: 90 | Refills: 1 | Status: ACTIVE | COMMUNITY
Start: 2020-12-31 | End: 1900-01-01

## 2021-01-04 RX ORDER — CYCLOBENZAPRINE HYDROCHLORIDE 10 MG/1
10 TABLET, FILM COATED ORAL
Qty: 270 | Refills: 1 | Status: ACTIVE | COMMUNITY
Start: 2020-10-29 | End: 1900-01-01

## 2021-01-29 ENCOUNTER — APPOINTMENT (OUTPATIENT)
Dept: INTERNAL MEDICINE | Facility: CLINIC | Age: 46
End: 2021-01-29
Payer: MEDICARE

## 2021-01-29 VITALS
HEIGHT: 64 IN | BODY MASS INDEX: 30.9 KG/M2 | WEIGHT: 181 LBS | OXYGEN SATURATION: 98 % | TEMPERATURE: 97.4 F | HEART RATE: 115 BPM

## 2021-01-29 DIAGNOSIS — M54.9 DORSALGIA, UNSPECIFIED: ICD-10-CM

## 2021-01-29 DIAGNOSIS — F41.9 ANXIETY DISORDER, UNSPECIFIED: ICD-10-CM

## 2021-01-29 DIAGNOSIS — F17.200 NICOTINE DEPENDENCE, UNSPECIFIED, UNCOMPLICATED: ICD-10-CM

## 2021-01-29 DIAGNOSIS — G89.29 DORSALGIA, UNSPECIFIED: ICD-10-CM

## 2021-01-29 DIAGNOSIS — F32.9 ANXIETY DISORDER, UNSPECIFIED: ICD-10-CM

## 2021-01-29 PROCEDURE — 99215 OFFICE O/P EST HI 40 MIN: CPT

## 2021-01-29 RX ORDER — TRAZODONE HYDROCHLORIDE 150 MG/1
150 TABLET ORAL
Qty: 90 | Refills: 1 | Status: ACTIVE | COMMUNITY
Start: 2020-02-19 | End: 1900-01-01

## 2021-01-29 RX ORDER — DULOXETINE HYDROCHLORIDE 60 MG/1
60 CAPSULE, DELAYED RELEASE PELLETS ORAL
Qty: 90 | Refills: 1 | Status: ACTIVE | COMMUNITY
Start: 2018-10-29 | End: 1900-01-01

## 2021-01-30 VITALS — SYSTOLIC BLOOD PRESSURE: 110 MMHG | DIASTOLIC BLOOD PRESSURE: 70 MMHG

## 2021-01-30 PROBLEM — F17.200 TOBACCO USE DISORDER: Status: ACTIVE | Noted: 2019-02-06

## 2021-01-30 PROBLEM — M54.9 CHRONIC BACK PAIN: Status: ACTIVE | Noted: 2018-08-29

## 2021-01-30 PROBLEM — F41.9 ANXIETY AND DEPRESSION: Status: ACTIVE | Noted: 2018-07-31

## 2021-01-30 NOTE — HEALTH RISK ASSESSMENT
[No] : In the past 12 months have you used drugs other than those required for medical reasons? No [No falls in past year] : Patient reported no falls in the past year [0] : 2) Feeling down, depressed, or hopeless: Not at all (0) [] : No [de-identified] : None [de-identified] : None [de-identified] : Regular [[id number]] : 0

## 2021-01-30 NOTE — ASSESSMENT
[FreeTextEntry1] : taking multiple psychoactive drugs and addictive potential was discussed\par need to get care at psych clinic was discussed and referral to [facility] psych in [facility]\par told would need to see pain specialist and is aware that would not be able to give any further opiates

## 2021-01-30 NOTE — PHYSICAL EXAM
[No Acute Distress] : no acute distress [No JVD] : no jugular venous distention [Normal Rate] : normal rate  [Regular Rhythm] : with a regular rhythm [No Edema] : there was no peripheral edema [Soft] : abdomen soft [Non Tender] : non-tender [No CVA Tenderness] : no CVA  tenderness [No Joint Swelling] : no joint swelling [Normal Gait] : normal gait [Alert and Oriented x3] : oriented to person, place, and time

## 2021-01-30 NOTE — HISTORY OF PRESENT ILLNESS
[FreeTextEntry1] : NEEDS MED REFILL [de-identified] : 44 yo female taking multiple meds as noted-requesting refills

## 2021-01-30 NOTE — REVIEW OF SYSTEMS
[Joint Pain] : joint pain [Anxiety] : anxiety [Fever] : no fever [Chest Pain] : no chest pain [Shortness Of Breath] : no shortness of breath [Abdominal Pain] : no abdominal pain [Dysuria] : no dysuria

## 2021-03-03 RX ORDER — ALPRAZOLAM 0.5 MG/1
0.5 TABLET ORAL
Qty: 60 | Refills: 0 | Status: ACTIVE | COMMUNITY
Start: 2018-08-29 | End: 1900-01-01

## 2021-03-03 RX ORDER — OXYCODONE AND ACETAMINOPHEN 10; 325 MG/1; MG/1
10-325 TABLET ORAL
Qty: 60 | Refills: 0 | Status: ACTIVE | COMMUNITY
Start: 2018-10-01 | End: 1900-01-01

## 2021-04-15 NOTE — HISTORY OF PRESENT ILLNESS
[Asthma] : asthma [Smoker] : smoker [(Patient denies any chest pain, claudication, dyspnea on exertion, orthopnea, palpitations or syncope)] : Patient denies any chest pain, claudication, dyspnea on exertion, orthopnea, palpitations or syncope physical therapy/psych, pain management/social work [FreeTextEntry1] : Right knee surgery [FreeTextEntry2] : 2/8/19 [FreeTextEntry3] : Dr. Venkata Solorzano [FreeTextEntry4] : 43 yr old presents for clearance for Right Knee Surgery / Orthoscopic according to Patient. feels well overall, has had mild stuffy/ runny nose with mild cough mainly in mornings. Reports cut down a lot smoking, has chronic aches/ pain neck, lower back and Knee. Complains of lots of stress in her life . Pt on multiple medications at this time including pain medication. Reports taking Nyquil past several nights for cold symptoms and to help her sleep. Pt reports her knee pain has interfered with her daily activities and ability to exercise which she knows is good for her health in many ways.

## 2021-10-06 PROBLEM — I10 ESSENTIAL HYPERTENSION: Status: ACTIVE | Noted: 2018-07-31

## 2022-06-24 ENCOUNTER — NON-APPOINTMENT (OUTPATIENT)
Age: 47
End: 2022-06-24